# Patient Record
Sex: FEMALE | Race: WHITE | NOT HISPANIC OR LATINO | Employment: FULL TIME | URBAN - METROPOLITAN AREA
[De-identification: names, ages, dates, MRNs, and addresses within clinical notes are randomized per-mention and may not be internally consistent; named-entity substitution may affect disease eponyms.]

---

## 2023-06-20 ENCOUNTER — OFFICE VISIT (OUTPATIENT)
Dept: URGENT CARE | Facility: CLINIC | Age: 53
End: 2023-06-20
Payer: COMMERCIAL

## 2023-06-20 VITALS
WEIGHT: 206 LBS | HEART RATE: 78 BPM | BODY MASS INDEX: 36.5 KG/M2 | TEMPERATURE: 98 F | RESPIRATION RATE: 18 BRPM | OXYGEN SATURATION: 97 % | DIASTOLIC BLOOD PRESSURE: 80 MMHG | SYSTOLIC BLOOD PRESSURE: 138 MMHG | HEIGHT: 63 IN

## 2023-06-20 DIAGNOSIS — H00.015 HORDEOLUM EXTERNUM OF LEFT LOWER EYELID: Primary | ICD-10-CM

## 2023-06-20 PROBLEM — R60.9 EDEMA: Status: ACTIVE | Noted: 2020-03-23

## 2023-06-20 PROBLEM — M25.531 BILATERAL WRIST PAIN: Status: ACTIVE | Noted: 2021-09-27

## 2023-06-20 PROBLEM — J32.9 SINUSITIS: Status: ACTIVE | Noted: 2020-09-25

## 2023-06-20 PROBLEM — R00.1 BRADYCARDIA: Status: ACTIVE | Noted: 2020-09-25

## 2023-06-20 PROBLEM — M54.50 LOW BACK PAIN: Status: ACTIVE | Noted: 2020-01-22

## 2023-06-20 PROBLEM — A88.1 EPIDEMIC VERTIGO: Status: ACTIVE | Noted: 2020-01-22

## 2023-06-20 PROBLEM — G47.00 INSOMNIA: Status: ACTIVE | Noted: 2020-03-23

## 2023-06-20 PROBLEM — M65.30 ACQUIRED TRIGGER FINGER: Status: ACTIVE | Noted: 2021-08-24

## 2023-06-20 PROBLEM — R34 OLIGURIA: Status: ACTIVE | Noted: 2021-02-22

## 2023-06-20 PROBLEM — Z83.3 FAMILY HISTORY OF DIABETES MELLITUS: Status: ACTIVE | Noted: 2021-02-22

## 2023-06-20 PROBLEM — R33.9 INCOMPLETE EMPTYING OF BLADDER: Status: ACTIVE | Noted: 2022-05-03

## 2023-06-20 PROBLEM — E78.5 HYPERLIPIDEMIA: Status: ACTIVE | Noted: 2023-01-11

## 2023-06-20 PROBLEM — R73.9 HYPERGLYCEMIA: Status: ACTIVE | Noted: 2019-03-07

## 2023-06-20 PROBLEM — R42 DIZZINESS: Status: ACTIVE | Noted: 2020-09-25

## 2023-06-20 PROBLEM — M19.90 ARTHRITIS: Status: ACTIVE | Noted: 2023-01-11

## 2023-06-20 PROBLEM — A69.20 LYME DISEASE: Status: ACTIVE | Noted: 2019-12-18

## 2023-06-20 PROBLEM — L98.9 SKIN LESION: Status: ACTIVE | Noted: 2021-08-24
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
## 2023-06-20 PROBLEM — E55.9 VITAMIN D DEFICIENCY: Status: ACTIVE | Noted: 2020-03-23

## 2023-06-20 PROBLEM — F41.9 ANXIETY: Status: ACTIVE | Noted: 2021-08-24

## 2023-06-20 PROBLEM — D64.9 CHRONIC ANEMIA: Status: ACTIVE | Noted: 2023-01-11

## 2023-06-20 PROBLEM — M25.532 BILATERAL WRIST PAIN: Status: ACTIVE | Noted: 2021-09-27

## 2023-06-20 PROBLEM — Z80.6 FAMILY HISTORY OF LEUKEMIA: Status: ACTIVE | Noted: 2021-02-22

## 2023-06-20 PROBLEM — E66.9 OBESITY: Status: ACTIVE | Noted: 2020-09-25

## 2023-06-20 PROBLEM — M25.50 JOINT PAIN: Status: ACTIVE | Noted: 2020-03-23

## 2023-06-20 PROCEDURE — G0382 LEV 3 HOSP TYPE B ED VISIT: HCPCS

## 2023-06-20 RX ORDER — ROSUVASTATIN CALCIUM 5 MG/1
TABLET, COATED ORAL
COMMUNITY

## 2023-06-20 RX ORDER — FENOFIBRATE 134 MG/1
CAPSULE ORAL
COMMUNITY

## 2023-06-20 RX ORDER — CELECOXIB 200 MG/1
CAPSULE ORAL
COMMUNITY
Start: 2023-02-02

## 2023-06-20 RX ORDER — ASPIRIN 81 MG/1
TABLET ORAL
COMMUNITY

## 2023-06-20 RX ORDER — ERGOCALCIFEROL 1.25 MG/1
CAPSULE ORAL
COMMUNITY
Start: 2023-05-13

## 2023-06-20 RX ORDER — NYSTATIN 100000 U/G
CREAM TOPICAL
COMMUNITY
Start: 2023-06-12

## 2023-06-20 RX ORDER — FUROSEMIDE 20 MG/1
TABLET ORAL
COMMUNITY
Start: 2023-04-29

## 2023-06-20 RX ORDER — LACTULOSE 10 G/15ML
SOLUTION ORAL
COMMUNITY
Start: 2023-05-25

## 2023-06-20 RX ORDER — ESZOPICLONE 3 MG/1
TABLET, FILM COATED ORAL
COMMUNITY

## 2023-06-20 RX ORDER — AMOXICILLIN 500 MG
CAPSULE ORAL
COMMUNITY

## 2023-06-20 RX ORDER — DIAZEPAM 5 MG/1
TABLET ORAL
COMMUNITY

## 2023-06-20 RX ORDER — ERYTHROMYCIN 5 MG/G
0.5 OINTMENT OPHTHALMIC EVERY 6 HOURS SCHEDULED
Qty: 3.5 G | Refills: 0 | Status: SHIPPED | OUTPATIENT
Start: 2023-06-20

## 2023-06-20 NOTE — PATIENT INSTRUCTIONS
Apply ointment to affected eye daily for next 5-7 days as directed as symptoms persist  May also use warm compresses daily for additional relief  Wash hands frequently and avoid touching eyes  Follow-up with PCP in 3-5 days if no improvement of symptoms  Report to ER if symptoms worsen

## 2023-06-20 NOTE — PROGRESS NOTES
"  [address]        NAME: Fred Lange is a 52 y o  female  : 1970    MRN: [id number]  DATE: 2023  TIME: 8:47 AM    Assessment and Plan   Hordeolum externum of left lower eyelid [H00 015]  1  Hordeolum externum of left lower eyelid  erythromycin (ILOTYCIN) ophthalmic ointment        No foreign bodies visualized under fluorescein light  Visual acuity (uncorrected): 20/20 both eyes, 20/20 left, 20/20 right  Patient Instructions     Apply ointment to affected eye daily for next 5-7 days as directed as symptoms persist  May also use warm compresses daily for additional relief  Wash hands frequently and avoid touching eyes  Follow-up with PCP in 3-5 days if no improvement of symptoms  Report to ER if symptoms worsen  Chief Complaint     Chief Complaint   Patient presents with   • Eye Pain     Left eye swollen shut this am Painful and itchy  History of Present Illness       52year old female presents with left eye pain, swollen shut, and itchiness  that she woke up this morning with  She denies any known sick contacts or triggers but does have a history of seasonal allergies and relates she was cutting grass on Saturday  She denies associated fevers but does report cough, congestion, and a watery discharge  She has tried warm compresses for symptoms with minimal improvement  Eye Pain   The left eye is affected  This is a new problem  The current episode started today  The problem occurs constantly  The problem has been unchanged  There was no injury mechanism  The pain is at a severity of 5/10  The pain is moderate  There is no known exposure to pink eye  She does not wear contacts  Associated symptoms include eye redness, a foreign body sensation (unsure, \"feels like a film\") and itching  Pertinent negatives include no blurred vision, eye discharge, double vision, fever, nausea, photophobia, recent URI or vomiting  Treatments tried: cool compresses   The treatment " provided no relief  Review of Systems   Review of Systems   Constitutional: Negative for activity change, appetite change, chills, fatigue and fever  HENT: Positive for congestion  Negative for postnasal drip and sore throat  Eyes: Positive for pain, redness and itching  Negative for blurred vision, double vision, photophobia, discharge and visual disturbance  Respiratory: Negative for chest tightness and shortness of breath  Gastrointestinal: Negative for abdominal pain, constipation, diarrhea, nausea and vomiting  Allergic/Immunologic: Positive for environmental allergies  Negative for food allergies  Neurological: Negative for dizziness, light-headedness and numbness           Current Medications       Current Outpatient Medications:   •  aspirin (ECOTRIN LOW STRENGTH) 81 mg EC tablet, Take by mouth, Disp: , Rfl:   •  celecoxib (CeleBREX) 200 mg capsule, Take by mouth, Disp: , Rfl:   •  diazepam (VALIUM) 5 mg tablet, Take by mouth, Disp: , Rfl:   •  ergocalciferol (VITAMIN D2) 50,000 units, , Disp: , Rfl:   •  erythromycin (ILOTYCIN) ophthalmic ointment, Administer 0 5 inches into the left eye every 6 (six) hours, Disp: 3 5 g, Rfl: 0  •  eszopiclone (LUNESTA) 3 MG tablet, Take by mouth, Disp: , Rfl:   •  fenofibrate micronized (LOFIBRA) 134 MG capsule, Take by mouth, Disp: , Rfl:   •  furosemide (LASIX) 20 mg tablet, TAKE 2 TABLETS IN AM AND 1 TABLET IN PM, Disp: , Rfl:   •  lactulose (CHRONULAC) 10 g/15 mL solution, , Disp: , Rfl:   •  nystatin (MYCOSTATIN) cream, , Disp: , Rfl:   •  Omega-3 Fatty Acids (Fish Oil) 1200 MG CAPS, Take by mouth, Disp: , Rfl:   •  rosuvastatin (CRESTOR) 5 mg tablet, Take by mouth, Disp: , Rfl:     Current Allergies     Allergies as of 06/20/2023 - Reviewed 06/20/2023   Allergen Reaction Noted   • Pollen extract Eye Swelling 06/20/2023            The following portions of the patient's history were reviewed and updated as appropriate: allergies, current "medications, past family history, past medical history, past social history, past surgical history and problem list      Past Medical History:   Diagnosis Date   • Hyperlipidemia    • Hypertension        Past Surgical History:   Procedure Laterality Date   • GASTRIC RESTRICTION SURGERY  2010   • TONSILLECTOMY         History reviewed  No pertinent family history  Medications have been verified  Objective   /80   Pulse 78   Temp 98 °F (36 7 °C)   Resp 18   Ht 5' 3\" (1 6 m)   Wt 93 4 kg (206 lb)   SpO2 97%   BMI 36 49 kg/m²        Physical Exam     Physical Exam  Vitals and nursing note reviewed  Constitutional:       General: She is awake  Appearance: Normal appearance  She is overweight  HENT:      Head: Normocephalic and atraumatic  Right Ear: Hearing normal       Left Ear: Hearing normal       Nose: Congestion present  No rhinorrhea  Right Turbinates: Not enlarged, swollen or pale  Left Turbinates: Not enlarged, swollen or pale  Mouth/Throat:      Lips: Pink  Mouth: Mucous membranes are moist       Pharynx: Oropharynx is clear  Eyes:      General: Vision grossly intact  Right eye: No foreign body, discharge or hordeolum  Left eye: Discharge and hordeolum present  No foreign body  Conjunctiva/sclera:      Right eye: Right conjunctiva is not injected  No chemosis, exudate or hemorrhage  Left eye: Left conjunctiva is injected  No chemosis, exudate or hemorrhage  Pupils: Pupils are equal, round, and reactive to light  Visual Fields: Right eye visual fields normal and left eye visual fields normal    Cardiovascular:      Rate and Rhythm: Normal rate and regular rhythm  Pulses: Normal pulses  Heart sounds: Normal heart sounds  Pulmonary:      Effort: Pulmonary effort is normal       Breath sounds: Normal breath sounds     Musculoskeletal:      Cervical back: Full passive range of motion without pain, normal range of " motion and neck supple  Lymphadenopathy:      Cervical: No cervical adenopathy  Skin:     General: Skin is warm and dry  Neurological:      Mental Status: She is alert and oriented to person, place, and time  Psychiatric:         Mood and Affect: Mood normal          Behavior: Behavior normal  Behavior is cooperative  Thought Content:  Thought content normal          Judgment: Judgment normal

## 2023-08-19 PROBLEM — J32.9 SINUSITIS: Status: RESOLVED | Noted: 2020-09-25 | Resolved: 2023-08-19

## 2023-10-30 ENCOUNTER — COSMETIC (OUTPATIENT)
Dept: PLASTIC SURGERY | Facility: CLINIC | Age: 53
End: 2023-10-30

## 2023-10-30 VITALS
DIASTOLIC BLOOD PRESSURE: 88 MMHG | HEART RATE: 54 BPM | BODY MASS INDEX: 39.55 KG/M2 | SYSTOLIC BLOOD PRESSURE: 132 MMHG | WEIGHT: 223.25 LBS | HEIGHT: 63 IN | TEMPERATURE: 97.2 F

## 2023-10-30 DIAGNOSIS — E65 ABDOMINAL PANNUS: ICD-10-CM

## 2023-10-30 DIAGNOSIS — Z41.1 ENCOUNTER FOR COSMETIC PROCEDURE: Primary | ICD-10-CM

## 2023-10-30 PROCEDURE — COSCON COSMETIC CONSULTATION: Performed by: STUDENT IN AN ORGANIZED HEALTH CARE EDUCATION/TRAINING PROGRAM

## 2023-10-30 RX ORDER — FLUTICASONE PROPIONATE 50 MCG
SPRAY, SUSPENSION (ML) NASAL
COMMUNITY

## 2023-10-30 RX ORDER — MULTIVITAMIN
1 TABLET ORAL DAILY
COMMUNITY

## 2023-10-30 RX ORDER — DULOXETIN HYDROCHLORIDE 20 MG/1
20 CAPSULE, DELAYED RELEASE ORAL DAILY
COMMUNITY
Start: 2023-10-27

## 2023-10-30 RX ORDER — FUROSEMIDE 40 MG/1
40 TABLET ORAL DAILY
COMMUNITY
Start: 2023-08-26

## 2023-10-30 NOTE — PROGRESS NOTES
Plastic Surgery Consult    Reason for visit: interested in abdominoplasty    HPI from 10/30/23:  Patient is a 51 y/o female who is interested in abdominoplasty. Patient is a massive weight loss patient who underwent gastric sleeve in 2010. Her max weight was 395 lbs and currently is 220 lbs, and has had net weight-loss of 170 lbs. She has no history of blood clots. Currently she is at her goal weight between 200-220 lbs. She states that with her weight loss she has had significant excess skin that hangs over her umbilicus and pubis. She has been following up with her PCP and has had documented treatments including powders and creams to alleviate her symptoms of intrigo, open wounds, moisture, foul smell. She states that the symptoms are significantly exacerbated with hot weather during the summer. She has been following up with PCP for over 3 months time. She is primarily interested in removal of the excess skin. ROS: 12 pt ROS negative, except as otherwise noted in HPI    Past Medical History:   Diagnosis Date    Hyperlipidemia     Hypertension        FamHx: non-contrib.  No blood clots  SurgHx: gastric sleeve, tubal ligation, tonsillectomy  SocHx: no tobacco. +social ETOH  Meds: no steroids, no blood thinners  Allergies: NKDA    PE:    Vitals:    10/30/23 1018   BP: 132/88   Pulse: (!) 54   Temp: (!) 97.2 °F (36.2 °C)       General: NC/AT, breathing comfortably on RA  Neuro: CN II-XII grossly intact, symmetric reflexes  HEENT: PERRLA, EOMI, external ears normal, no lesions or deformities, neck supple, trachea midline  Respiratory: CTAB, normal respiratory effort  Cardio: RRR, normal S1, S2, no murmur, rubs, gallops  GI: soft, non-tender, non-distended  Extremities/MSK: normal alignment, mobility, gait, no edema  Skin: no rashes, lesions, subcutaneous nodules    BMI 39.55    Significant moderate-severe diffuse abdominal lipodystrophy   Severe excess skin in the vertical and horizontal vectors  Overhanging double pannus over the umbilicus and pubic area  Umbilical pannus with periumbilical intertrigo and redness  Overhanging abdominal pannus overhangs pubis by 5 cm, moist areas underneath  Minimal diastasis, no hernia    A/P: 51 y/o female who presents with symptomatic abdominal double pannus from massive weight loss of 170 lbs, now with stable weight over last 6-12 months.    -Discussed with patient that she is a good candidate for wazev-kt-okl panniculectomy. I explained that ssupv-mw-oqf panniculectomy is a functional procedure, not cosmetic. The only thing that will be removed is the overhanging excess abdominal skin in the umbilical region and overhanging pubic region. The abdominal contour and lipodystrophy is not addressed. Also, loss of umbilicus is a possibility as well as inferiorly displaced umbilicus. Patient acknowledged. She understands that the surgery will only remove the excess overhanging skin causing her symptoms. Due to the excess skin in the vertical and horizontal components as well as the overhanging double pannus over the umbilicus, patient would benefit from eypxg-tp-upt skin excision. This would create vertical midline scar, patient is okay and acknowledged this.  -At this time, patient is NOT a candidate for lipoabdominoplasty due to her elevated BMI of 39. I informed her that her BMI would need to be closer to 30 before any cosmetic surgery would be discussed. Currently, patient would like to proceed only with functional, insurance [id number] panniculectomy.  -I also informed her that her elevated BMI increases her risk of complications including but not limited to wound breakdown, infection, abscesses, delayed wound healing. Patient acknowledged.  -Risks, benefits, procedure, and complications discussed. Patient acknowledged.  All questions answered, concerns addressed.  -Photos taken, will submit for insurance approval.  -If approved, will require preop labs (CBC, CMP, HgbA1C, PT/INR, iron panel, Vit B12/folate) to be completed for preop appointment  -Spent 45 minutes in consultation with patient.  Greater than 50% of the total time was spent obtaining history, evaluation, performing exam, discussion of management options including post-operative care, answering patient's questions and concerns, chart reviewing, and documentation         Anthony Patel MD   [address] [place] [address] 304 and Reconstructive Surgery   [facility], [facility] Sharri Ortiz   Office: [phone]

## 2023-11-01 ENCOUNTER — TELEPHONE (OUTPATIENT)
Dept: PLASTIC SURGERY | Facility: CLINIC | Age: 53
End: 2023-11-01

## 2023-11-01 NOTE — TELEPHONE ENCOUNTER
Lm for patient to call me to discuss criteria for panniculectomy. Patient came in to see Dr. Jacobson Handing on 10/31 as cosmetic but wants through insurance. We need all documentation, 3 months of prescription cream/powder. Cassandra Diaz needs all the information to turn into [employer] for review and determination of surgery.

## 2023-11-08 ENCOUNTER — TELEPHONE (OUTPATIENT)
Dept: PLASTIC SURGERY | Facility: CLINIC | Age: 53
End: 2023-11-08

## 2023-11-08 NOTE — TELEPHONE ENCOUNTER
Called patient to review necessary criteria for panniculectomy documentation to be sent in to insurance company. Patient had seen Dr. Ruben Salguero.  Informed patient to please get me the information from her PCP so we can review and if enough then we can send to insurance for their review. Let patient know we need this documentation by 11/10/2023. Emailed criteria as well for patient reference.

## 2023-11-17 ENCOUNTER — PREP FOR PROCEDURE (OUTPATIENT)
Dept: PLASTIC SURGERY | Facility: CLINIC | Age: 53
End: 2023-11-17

## 2023-11-17 DIAGNOSIS — L98.7 EXCESS SKIN OF ABDOMEN: Primary | ICD-10-CM

## 2023-11-17 DIAGNOSIS — M79.3 PANNICULITIS: ICD-10-CM

## 2023-11-17 DIAGNOSIS — L30.4 INTERTRIGO: ICD-10-CM

## 2023-11-27 ENCOUNTER — TELEPHONE (OUTPATIENT)
Dept: PLASTIC SURGERY | Facility: CLINIC | Age: 53
End: 2023-11-27

## 2023-11-27 NOTE — TELEPHONE ENCOUNTER
Returned patients phone call and left her a message regarding labs that needs to be done before surgery.

## 2024-02-08 ENCOUNTER — OFFICE VISIT (OUTPATIENT)
Dept: PLASTIC SURGERY | Facility: CLINIC | Age: 54
End: 2024-02-08
Payer: COMMERCIAL

## 2024-02-08 VITALS
HEIGHT: 62 IN | DIASTOLIC BLOOD PRESSURE: 85 MMHG | HEART RATE: 82 BPM | WEIGHT: 205.13 LBS | TEMPERATURE: 97.7 F | SYSTOLIC BLOOD PRESSURE: 108 MMHG | BODY MASS INDEX: 37.75 KG/M2

## 2024-02-08 DIAGNOSIS — E65 ABDOMINAL PANNUS: Primary | ICD-10-CM

## 2024-02-08 PROCEDURE — CP99022 PRE-OP COSMETIC EVALUATION: Performed by: STUDENT IN AN ORGANIZED HEALTH CARE EDUCATION/TRAINING PROGRAM

## 2024-02-08 PROCEDURE — 99214 OFFICE O/P EST MOD 30 MIN: CPT | Performed by: STUDENT IN AN ORGANIZED HEALTH CARE EDUCATION/TRAINING PROGRAM

## 2024-02-08 RX ORDER — DULOXETIN HYDROCHLORIDE 60 MG/1
CAPSULE, DELAYED RELEASE ORAL
COMMUNITY
Start: 2024-02-07

## 2024-02-09 ENCOUNTER — APPOINTMENT (OUTPATIENT)
Dept: LAB | Facility: CLINIC | Age: 54
End: 2024-02-09
Payer: COMMERCIAL

## 2024-02-09 DIAGNOSIS — L98.7 EXCESS SKIN OF ABDOMEN: ICD-10-CM

## 2024-02-09 DIAGNOSIS — L30.4 INTERTRIGO: ICD-10-CM

## 2024-02-09 DIAGNOSIS — M79.3 PANNICULITIS: ICD-10-CM

## 2024-02-09 LAB
ALBUMIN SERPL BCP-MCNC: 4.5 G/DL (ref 3.5–5)
ALP SERPL-CCNC: 42 U/L (ref 34–104)
ALT SERPL W P-5'-P-CCNC: 18 U/L (ref 7–52)
ANION GAP SERPL CALCULATED.3IONS-SCNC: 13 MMOL/L
AST SERPL W P-5'-P-CCNC: 22 U/L (ref 13–39)
BASOPHILS # BLD AUTO: 0.02 THOUSANDS/ÂΜL (ref 0–0.1)
BASOPHILS NFR BLD AUTO: 0 % (ref 0–1)
BILIRUB SERPL-MCNC: 0.68 MG/DL (ref 0.2–1)
BUN SERPL-MCNC: 14 MG/DL (ref 5–25)
CALCIUM SERPL-MCNC: 9.4 MG/DL (ref 8.4–10.2)
CHLORIDE SERPL-SCNC: 98 MMOL/L (ref 96–108)
CO2 SERPL-SCNC: 31 MMOL/L (ref 21–32)
CREAT SERPL-MCNC: 0.69 MG/DL (ref 0.6–1.3)
EOSINOPHIL # BLD AUTO: 0.11 THOUSAND/ÂΜL (ref 0–0.61)
EOSINOPHIL NFR BLD AUTO: 2 % (ref 0–6)
ERYTHROCYTE [DISTWIDTH] IN BLOOD BY AUTOMATED COUNT: 14.5 % (ref 11.6–15.1)
GFR SERPL CREATININE-BSD FRML MDRD: 98 ML/MIN/1.73SQ M
GLUCOSE P FAST SERPL-MCNC: 75 MG/DL (ref 65–99)
HCT VFR BLD AUTO: 44 % (ref 34.8–46.1)
HGB BLD-MCNC: 14.1 G/DL (ref 11.5–15.4)
IMM GRANULOCYTES # BLD AUTO: 0.01 THOUSAND/UL (ref 0–0.2)
IMM GRANULOCYTES NFR BLD AUTO: 0 % (ref 0–2)
LYMPHOCYTES # BLD AUTO: 2.74 THOUSANDS/ÂΜL (ref 0.6–4.47)
LYMPHOCYTES NFR BLD AUTO: 44 % (ref 14–44)
MCH RBC QN AUTO: 30.7 PG (ref 26.8–34.3)
MCHC RBC AUTO-ENTMCNC: 32 G/DL (ref 31.4–37.4)
MCV RBC AUTO: 96 FL (ref 82–98)
MONOCYTES # BLD AUTO: 0.69 THOUSAND/ÂΜL (ref 0.17–1.22)
MONOCYTES NFR BLD AUTO: 11 % (ref 4–12)
NEUTROPHILS # BLD AUTO: 2.67 THOUSANDS/ÂΜL (ref 1.85–7.62)
NEUTS SEG NFR BLD AUTO: 43 % (ref 43–75)
NRBC BLD AUTO-RTO: 0 /100 WBCS
PLATELET # BLD AUTO: 311 THOUSANDS/UL (ref 149–390)
PMV BLD AUTO: 10.2 FL (ref 8.9–12.7)
POTASSIUM SERPL-SCNC: 3.4 MMOL/L (ref 3.5–5.3)
PROT SERPL-MCNC: 7.4 G/DL (ref 6.4–8.4)
RBC # BLD AUTO: 4.59 MILLION/UL (ref 3.81–5.12)
SODIUM SERPL-SCNC: 142 MMOL/L (ref 135–147)
WBC # BLD AUTO: 6.24 THOUSAND/UL (ref 4.31–10.16)

## 2024-02-09 PROCEDURE — 80053 COMPREHEN METABOLIC PANEL: CPT

## 2024-02-09 PROCEDURE — 36415 COLL VENOUS BLD VENIPUNCTURE: CPT

## 2024-02-09 PROCEDURE — 85025 COMPLETE CBC W/AUTO DIFF WBC: CPT

## 2024-02-11 RX ORDER — NALOXONE HYDROCHLORIDE 4 MG/.1ML
SPRAY NASAL
Qty: 1 EACH | Refills: 1 | Status: SHIPPED | OUTPATIENT
Start: 2024-02-11 | End: 2025-02-10

## 2024-02-11 RX ORDER — DOCUSATE SODIUM 100 MG/1
100 CAPSULE, LIQUID FILLED ORAL 2 TIMES DAILY PRN
Qty: 20 CAPSULE | Refills: 2 | Status: SHIPPED | OUTPATIENT
Start: 2024-02-11

## 2024-02-11 RX ORDER — ACETAMINOPHEN 500 MG
500 TABLET ORAL EVERY 4 HOURS PRN
Qty: 30 TABLET | Refills: 2 | Status: SHIPPED | OUTPATIENT
Start: 2024-02-11

## 2024-02-11 RX ORDER — OXYCODONE HYDROCHLORIDE 5 MG/1
5 TABLET ORAL EVERY 4 HOURS PRN
Qty: 30 TABLET | Refills: 0 | Status: SHIPPED | OUTPATIENT
Start: 2024-02-11

## 2024-02-11 RX ORDER — ONDANSETRON 4 MG/1
4 TABLET, FILM COATED ORAL EVERY 8 HOURS PRN
Qty: 20 TABLET | Refills: 0 | Status: SHIPPED | OUTPATIENT
Start: 2024-02-11

## 2024-02-11 NOTE — PROGRESS NOTES
Plastic Surgery Consult     Reason for visit: preop for liposuction of abdomen and uiiur-fr-lfd panniculectomy    HPI from 2/8/24:    Preop for liposuction of abdomen and osvut-sj-zda panniculectomy     BMI 37.5     Significant moderate-severe diffuse abdominal lipodystrophy   Severe excess skin in the vertical and horizontal vectors  Overhanging double pannus over the umbilicus and pubic area  Umbilical pannus with periumbilical intertrigo and redness  Overhanging abdominal pannus overhangs pubis by 5 cm, moist areas underneath  Minimal diastasis, no hernia    I discussed that the bmosb-zu-abm procedure is a functional surgery and the liposuction is cosmetic for improved body contouring. Patient acknowledged.     Discussed risks, benefits, procedure and complications including but not limited to infection, bleeding, scarring, abscess, seromas, blood clots, contour deformity and asymmetry. With liposuction, contour deformities are a risk. I discussed scarring, loss of umbilicus, possible inferior displacement of umbilicus. Patient acknowledged.     I discussed postoperative care with usage of drains and garment was measured.    I discussed increased risk of complications given her elevated BMI of 37.5, including wound dehiscence particularly at T incision and delayed wound healing. Patient acknowledged.     Photos taken, surgery scheduled.  Will need tumescence and exparel  Garment measured  Consents obtained, will send prescriptions to pharmacy  -Spent 35 minutes in consultation with patient. Greater than 50% of the total time was spent obtaining history, evaluation, performing exam, discussion of management options including post-operative care, answering patient's questions and concerns, chart reviewing, and documentation       Leonard Bustamante MD   [facility]   [address] [place] [address], [address]   [place] [address]   Office: [phone]       HPI from 10/30/23:  Patient is a 54 y/o  female who is interested in abdominoplasty. Patient is a massive weight loss patient who underwent gastric sleeve in 2010. Her max weight was 395 lbs and currently is 220 lbs, and has had net weight-loss of 170 lbs. She has no history of blood clots.      Currently she is at her goal weight between 200-220 lbs. She states that with her weight loss she has had significant excess skin that hangs over her umbilicus and pubis. She has been following up with her PCP and has had documented treatments including powders and creams to alleviate her symptoms of intrigo, open wounds, moisture, foul smell. She states that the symptoms are significantly exacerbated with hot weather during the summer. She has been following up with PCP for over 3 months time.      She is primarily interested in removal of the excess skin.     ROS: 12 pt ROS negative, except as otherwise noted in HPI     Medical History        Past Medical History:   Diagnosis Date    Hyperlipidemia      Hypertension              FamHx: non-contrib. No blood clots  SurgHx: gastric sleeve, tubal ligation, tonsillectomy  SocHx: no tobacco. +social ETOH  Meds: no steroids, no blood thinners  Allergies: NKDA     PE:         Vitals:     10/30/23 1018   BP: 132/88   Pulse: (!) 54   Temp: (!) 97.2 °F (36.2 °C)         General: NC/AT, breathing comfortably on RA  Neuro: CN II-XII grossly intact, symmetric reflexes  HEENT: PERRLA, EOMI, external ears normal, no lesions or deformities, neck supple, trachea midline  Respiratory: CTAB, normal respiratory effort  Cardio: RRR, normal S1, S2, no murmur, rubs, gallops  GI: soft, non-tender, non-distended  Extremities/MSK: normal alignment, mobility, gait, no edema  Skin: no rashes, lesions, subcutaneous nodules     BMI 39.55     Significant moderate-severe diffuse abdominal lipodystrophy   Severe excess skin in the vertical and horizontal vectors  Overhanging double pannus over the umbilicus and pubic area  Umbilical pannus with  periumbilical intertrigo and redness  Overhanging abdominal pannus overhangs pubis by 5 cm, moist areas underneath  Minimal diastasis, no hernia     A/P: 52 y/o female who presents with symptomatic abdominal double pannus from massive weight loss of 170 lbs, now with stable weight over last 6-12 months.     -Discussed with patient that she is a good candidate for fpqmd-yr-qna panniculectomy. I explained that liduz-kx-kji panniculectomy is a functional procedure, not cosmetic. The only thing that will be removed is the overhanging excess abdominal skin in the umbilical region and overhanging pubic region. The abdominal contour and lipodystrophy is not addressed. Also, loss of umbilicus is a possibility as well as inferiorly displaced umbilicus. Patient acknowledged. She understands that the surgery will only remove the excess overhanging skin causing her symptoms. Due to the excess skin in the vertical and horizontal components as well as the overhanging double pannus over the umbilicus, patient would benefit from hzdzo-iu-uol skin excision. This would create vertical midline scar, patient is okay and acknowledged this.  -At this time, patient is NOT a candidate for lipoabdominoplasty due to her elevated BMI of 39. I informed her that her BMI would need to be closer to 30 before any cosmetic surgery would be discussed. Currently, patient would like to proceed only with functional, insurance [id number]-ob-rbz panniculectomy.  -I also informed her that her elevated BMI increases her risk of complications including but not limited to wound breakdown, infection, abscesses, delayed wound healing. Patient acknowledged.  -Risks, benefits, procedure, and complications discussed. Patient acknowledged. All questions answered, concerns addressed.  -Photos taken, will submit for insurance approval.  -If approved, will require preop labs (CBC, CMP, HgbA1C, PT/INR, iron panel, Vit B12/folate) to be completed for preop  appointment  -Spent 45 minutes in consultation with patient. Greater than 50% of the total time was spent obtaining history, evaluation, performing exam, discussion of management options including post-operative care, answering patient's questions and concerns, chart reviewing, and documentation           Leonard Bustamante MD   [facility]astic and Reconstructive Surgery   [address] [place] [address], [address]   [place] [address]   Office: [phone]

## 2024-02-27 NOTE — PRE-PROCEDURE INSTRUCTIONS
Pre-Surgery Instructions:   Medication Instructions    acetaminophen (TYLENOL) 500 mg tablet Uses PRN- OK to take day of surgery    aspirin (ECOTRIN LOW STRENGTH) 81 mg EC tablet Stop taking 7 days prior to surgery.    celecoxib (CeleBREX) 200 mg capsule Stop taking 3 days prior to surgery.    diazepam (VALIUM) 5 mg tablet Take day of surgery.    docusate sodium (COLACE) 100 mg capsule Take day of surgery.    DULoxetine (CYMBALTA) 60 mg delayed release capsule Take night before surgery    eszopiclone (LUNESTA) 3 MG tablet Take night before surgery    fenofibrate micronized (LOFIBRA) 134 MG capsule Take night before surgery    fluticasone (FLONASE) 50 mcg/act nasal spray Uses PRN- OK to take day of surgery    furosemide (LASIX) 20 mg tablet Hold day of surgery.    furosemide (LASIX) 40 mg tablet Hold day of surgery.    lactulose (CHRONULAC) 10 g/15 mL solution Hold day of surgery.    Multiple Vitamin (Multi-Vitamin Daily) TABS Stop taking 7 days prior to surgery.    naloxone (NARCAN) 4 mg/0.1 mL nasal spray Hold day of surgery.    nystatin (MYCOSTATIN) cream Uses PRN- DO NOT take day of surgery    ondansetron (ZOFRAN) 4 mg tablet Uses PRN- OK to take day of surgery    oxyCODONE (Roxicodone) 5 immediate release tablet Uses PRN- OK to take day of surgery    rosuvastatin (CRESTOR) 5 mg tablet Take night before surgery   Medication instructions for day surgery reviewed. Please use only a sip of water to take your instructed medications. Avoid all over the counter vitamins, supplements and NSAIDS for one week prior to surgery per anesthesia guidelines. Tylenol is ok to take as needed.     You will receive a call one business day prior to surgery with an arrival time and hospital directions. If your surgery is scheduled on a Monday, the hospital will be calling you on the Friday prior to your surgery. If you have not heard from anyone by 8pm, please call the hospital supervisor through the hospital  at  [phone]. ([place] [phone] or [place] [phone]).    Do not eat or drink anything after midnight the night before your surgery, including candy, mints, lifesavers, or chewing gum. Do not drink alcohol 24hrs before your surgery. Try not to smoke at least 24hrs before your surgery.       Follow the pre surgery showering instructions as listed in the “My Surgical Experience Booklet” or otherwise provided by your surgeon's office. Do not use a blade to shave the surgical area 1 week before surgery. It is okay to use a clean electric clippers up to 24 hours before surgery. Do not apply any lotions, creams, including makeup, cologne, deodorant, or perfumes after showering on the day of your surgery. Do not use dry shampoo, hair spray, hair gel, or any type of hair products.     No contact lenses, eye make-up, or artificial eyelashes. Remove nail polish, including gel polish, and any artificial, gel, or acrylic nails if possible. Remove all jewelry including rings and body piercing jewelry.     Wear causal clothing that is easy to take on and off. Consider your type of surgery.    Keep any valuables, jewelry, piercings at home. Please bring any specially ordered equipment (sling, braces) if indicated.    Arrange for a responsible person to drive you to and from the hospital on the day of your surgery. Please confirm the visitor policy for the day of your procedure when you receive your phone call with an arrival time.     Call the surgeon's office with any new illnesses, exposures, or additional questions prior to surgery.    Please reference your “My Surgical Experience Booklet” for additional information to prepare for your upcoming surgery.

## 2024-02-29 ENCOUNTER — TELEPHONE (OUTPATIENT)
Age: 54
End: 2024-02-29

## 2024-02-29 NOTE — TELEPHONE ENCOUNTER
S/W patient and she wanted work from home. From 3/18/2024-4/15/2024 8 hour days. Asked that I call Hieu Jung @ [phone] and inform her.     Called Hieu and informed her of the plan and she stated that it was all set.

## 2024-02-29 NOTE — TELEPHONE ENCOUNTER
Rec'd call from patient requesting to speak to Domitila regarding her FMLA paperwork.    She states that her consultant has some issues.    Domitila currently unavailable to speak to patient - told her that I would forward message.    Please return call to patient.    (Medical Accommodation Consultation is Lee Behrman [phone]. - Patient states that you'll need this info at some time.)

## 2024-03-01 DIAGNOSIS — E65 ABDOMINAL PANNUS: ICD-10-CM

## 2024-03-01 RX ORDER — ONDANSETRON 4 MG/1
TABLET, FILM COATED ORAL
Qty: 20 TABLET | Refills: 0 | Status: SHIPPED | OUTPATIENT
Start: 2024-03-01

## 2024-03-07 ENCOUNTER — HOSPITAL ENCOUNTER (OUTPATIENT)
Facility: HOSPITAL | Age: 54
Setting detail: OUTPATIENT SURGERY
Discharge: HOME | End: 2024-03-07
Attending: STUDENT IN AN ORGANIZED HEALTH CARE EDUCATION/TRAINING PROGRAM | Admitting: STUDENT IN AN ORGANIZED HEALTH CARE EDUCATION/TRAINING PROGRAM
Payer: SELF-PAY

## 2024-03-07 ENCOUNTER — ANESTHESIA EVENT (OUTPATIENT)
Dept: PERIOP | Facility: HOSPITAL | Age: 54
End: 2024-03-07
Payer: SELF-PAY

## 2024-03-07 ENCOUNTER — ANESTHESIA (OUTPATIENT)
Dept: PERIOP | Facility: HOSPITAL | Age: 54
End: 2024-03-07
Payer: SELF-PAY

## 2024-03-07 VITALS
BODY MASS INDEX: 37.73 KG/M2 | OXYGEN SATURATION: 97 % | SYSTOLIC BLOOD PRESSURE: 101 MMHG | HEART RATE: 68 BPM | RESPIRATION RATE: 16 BRPM | DIASTOLIC BLOOD PRESSURE: 59 MMHG | TEMPERATURE: 97.8 F | WEIGHT: 205 LBS | HEIGHT: 62 IN

## 2024-03-07 PROCEDURE — C9290 INJ, BUPIVACAINE LIPOSOME: HCPCS | Performed by: STUDENT IN AN ORGANIZED HEALTH CARE EDUCATION/TRAINING PROGRAM

## 2024-03-07 PROCEDURE — 15847 EXC SKIN ABD ADD-ON: CPT | Performed by: PHYSICIAN ASSISTANT

## 2024-03-07 PROCEDURE — 15830 EXC EXCESSIVE SKIN ABDOMEN: CPT | Performed by: PHYSICIAN ASSISTANT

## 2024-03-07 PROCEDURE — 15847 EXC SKIN ABD ADD-ON: CPT | Performed by: STUDENT IN AN ORGANIZED HEALTH CARE EDUCATION/TRAINING PROGRAM

## 2024-03-07 PROCEDURE — 15830 EXC EXCESSIVE SKIN ABDOMEN: CPT | Performed by: STUDENT IN AN ORGANIZED HEALTH CARE EDUCATION/TRAINING PROGRAM

## 2024-03-07 PROCEDURE — 99024 POSTOP FOLLOW-UP VISIT: CPT | Performed by: STUDENT IN AN ORGANIZED HEALTH CARE EDUCATION/TRAINING PROGRAM

## 2024-03-07 RX ORDER — MINERAL OIL
OIL (ML) MISCELLANEOUS AS NEEDED
Status: DISCONTINUED | OUTPATIENT
Start: 2024-03-07 | End: 2024-03-07 | Stop reason: HOSPADM

## 2024-03-07 RX ORDER — LIDOCAINE HYDROCHLORIDE AND EPINEPHRINE 10; 10 MG/ML; UG/ML
INJECTION, SOLUTION INFILTRATION; PERINEURAL AS NEEDED
Status: DISCONTINUED | OUTPATIENT
Start: 2024-03-07 | End: 2024-03-07 | Stop reason: HOSPADM

## 2024-03-07 RX ORDER — SODIUM CHLORIDE, SODIUM LACTATE, POTASSIUM CHLORIDE, CALCIUM CHLORIDE 600; 310; 30; 20 MG/100ML; MG/100ML; MG/100ML; MG/100ML
125 INJECTION, SOLUTION INTRAVENOUS CONTINUOUS
Status: DISCONTINUED | OUTPATIENT
Start: 2024-03-07 | End: 2024-03-07 | Stop reason: HOSPADM

## 2024-03-07 RX ORDER — LIDOCAINE HYDROCHLORIDE 10 MG/ML
INJECTION, SOLUTION EPIDURAL; INFILTRATION; INTRACAUDAL; PERINEURAL AS NEEDED
Status: DISCONTINUED | OUTPATIENT
Start: 2024-03-07 | End: 2024-03-07

## 2024-03-07 RX ORDER — OXYCODONE HYDROCHLORIDE 5 MG/1
5 TABLET ORAL EVERY 4 HOURS PRN
Status: DISCONTINUED | OUTPATIENT
Start: 2024-03-07 | End: 2024-03-07 | Stop reason: HOSPADM

## 2024-03-07 RX ORDER — DEXAMETHASONE SODIUM PHOSPHATE 10 MG/ML
INJECTION, SOLUTION INTRAMUSCULAR; INTRAVENOUS AS NEEDED
Status: DISCONTINUED | OUTPATIENT
Start: 2024-03-07 | End: 2024-03-07

## 2024-03-07 RX ORDER — PROPOFOL 10 MG/ML
INJECTION, EMULSION INTRAVENOUS CONTINUOUS PRN
Status: DISCONTINUED | OUTPATIENT
Start: 2024-03-07 | End: 2024-03-07

## 2024-03-07 RX ORDER — ONDANSETRON 2 MG/ML
INJECTION INTRAMUSCULAR; INTRAVENOUS AS NEEDED
Status: DISCONTINUED | OUTPATIENT
Start: 2024-03-07 | End: 2024-03-07

## 2024-03-07 RX ORDER — ONDANSETRON 2 MG/ML
4 INJECTION INTRAMUSCULAR; INTRAVENOUS ONCE AS NEEDED
Status: DISCONTINUED | OUTPATIENT
Start: 2024-03-07 | End: 2024-03-07 | Stop reason: HOSPADM

## 2024-03-07 RX ORDER — NALOXONE HYDROCHLORIDE 0.4 MG/ML
INJECTION, SOLUTION INTRAMUSCULAR; INTRAVENOUS; SUBCUTANEOUS AS NEEDED
Status: DISCONTINUED | OUTPATIENT
Start: 2024-03-07 | End: 2024-03-07

## 2024-03-07 RX ORDER — DIPHENHYDRAMINE HYDROCHLORIDE 50 MG/ML
12.5 INJECTION INTRAMUSCULAR; INTRAVENOUS ONCE AS NEEDED
Status: DISCONTINUED | OUTPATIENT
Start: 2024-03-07 | End: 2024-03-07 | Stop reason: HOSPADM

## 2024-03-07 RX ORDER — FENTANYL CITRATE/PF 50 MCG/ML
50 SYRINGE (ML) INJECTION
Status: DISCONTINUED | OUTPATIENT
Start: 2024-03-07 | End: 2024-03-07 | Stop reason: HOSPADM

## 2024-03-07 RX ORDER — CEFAZOLIN SODIUM 2 G/50ML
2000 SOLUTION INTRAVENOUS ONCE
Status: COMPLETED | OUTPATIENT
Start: 2024-03-07 | End: 2024-03-07

## 2024-03-07 RX ORDER — FENTANYL CITRATE 50 UG/ML
INJECTION, SOLUTION INTRAMUSCULAR; INTRAVENOUS AS NEEDED
Status: DISCONTINUED | OUTPATIENT
Start: 2024-03-07 | End: 2024-03-07

## 2024-03-07 RX ORDER — ACETAMINOPHEN 10 MG/ML
INJECTION, SOLUTION INTRAVENOUS AS NEEDED
Status: DISCONTINUED | OUTPATIENT
Start: 2024-03-07 | End: 2024-03-07

## 2024-03-07 RX ORDER — METOCLOPRAMIDE HYDROCHLORIDE 5 MG/ML
INJECTION INTRAMUSCULAR; INTRAVENOUS AS NEEDED
Status: DISCONTINUED | OUTPATIENT
Start: 2024-03-07 | End: 2024-03-07

## 2024-03-07 RX ORDER — HYDROMORPHONE HYDROCHLORIDE 2 MG/ML
INJECTION, SOLUTION INTRAMUSCULAR; INTRAVENOUS; SUBCUTANEOUS AS NEEDED
Status: DISCONTINUED | OUTPATIENT
Start: 2024-03-07 | End: 2024-03-07

## 2024-03-07 RX ORDER — PROPOFOL 10 MG/ML
INJECTION, EMULSION INTRAVENOUS AS NEEDED
Status: DISCONTINUED | OUTPATIENT
Start: 2024-03-07 | End: 2024-03-07

## 2024-03-07 RX ORDER — GABAPENTIN 300 MG/1
300 CAPSULE ORAL ONCE
Status: COMPLETED | OUTPATIENT
Start: 2024-03-07 | End: 2024-03-07

## 2024-03-07 RX ORDER — ACETAMINOPHEN 325 MG/1
975 TABLET ORAL ONCE
Status: COMPLETED | OUTPATIENT
Start: 2024-03-07 | End: 2024-03-07

## 2024-03-07 RX ORDER — BUPIVACAINE HYDROCHLORIDE 2.5 MG/ML
INJECTION, SOLUTION EPIDURAL; INFILTRATION; INTRACAUDAL AS NEEDED
Status: DISCONTINUED | OUTPATIENT
Start: 2024-03-07 | End: 2024-03-07 | Stop reason: HOSPADM

## 2024-03-07 RX ORDER — ROCURONIUM BROMIDE 10 MG/ML
INJECTION, SOLUTION INTRAVENOUS AS NEEDED
Status: DISCONTINUED | OUTPATIENT
Start: 2024-03-07 | End: 2024-03-07

## 2024-03-07 RX ORDER — SODIUM CHLORIDE, SODIUM LACTATE, POTASSIUM CHLORIDE, CALCIUM CHLORIDE 600; 310; 30; 20 MG/100ML; MG/100ML; MG/100ML; MG/100ML
INJECTION, SOLUTION INTRAVENOUS CONTINUOUS PRN
Status: DISCONTINUED | OUTPATIENT
Start: 2024-03-07 | End: 2024-03-07

## 2024-03-07 RX ADMIN — LIDOCAINE HYDROCHLORIDE: 10 INJECTION, SOLUTION EPIDURAL; INFILTRATION; INTRACAUDAL; PERINEURAL at 16:28

## 2024-03-07 RX ADMIN — SODIUM CHLORIDE, SODIUM LACTATE, POTASSIUM CHLORIDE, AND CALCIUM CHLORIDE: .6; .31; .03; .02 INJECTION, SOLUTION INTRAVENOUS at 10:11

## 2024-03-07 RX ADMIN — ONDANSETRON 4 MG: 2 INJECTION INTRAMUSCULAR; INTRAVENOUS at 16:23

## 2024-03-07 RX ADMIN — ACETAMINOPHEN 1000 MG: 10 INJECTION INTRAVENOUS at 17:00

## 2024-03-07 RX ADMIN — LIDOCAINE HYDROCHLORIDE 50 MG: 10 INJECTION, SOLUTION EPIDURAL; INFILTRATION; INTRACAUDAL; PERINEURAL at 13:27

## 2024-03-07 RX ADMIN — HYDROMORPHONE HYDROCHLORIDE 0.5 MG: 2 INJECTION, SOLUTION INTRAMUSCULAR; INTRAVENOUS; SUBCUTANEOUS at 14:07

## 2024-03-07 RX ADMIN — CEFAZOLIN SODIUM 2000 MG: 2 SOLUTION INTRAVENOUS at 13:20

## 2024-03-07 RX ADMIN — ROCURONIUM BROMIDE 20 MG: 10 INJECTION, SOLUTION INTRAVENOUS at 14:12

## 2024-03-07 RX ADMIN — DEXAMETHASONE SODIUM PHOSPHATE 10 MG: 10 INJECTION, SOLUTION INTRAMUSCULAR; INTRAVENOUS at 14:01

## 2024-03-07 RX ADMIN — OXYCODONE HYDROCHLORIDE 5 MG: 5 TABLET ORAL at 18:28

## 2024-03-07 RX ADMIN — HYDROMORPHONE HYDROCHLORIDE 0.5 MG: 2 INJECTION, SOLUTION INTRAMUSCULAR; INTRAVENOUS; SUBCUTANEOUS at 16:02

## 2024-03-07 RX ADMIN — ACETAMINOPHEN 975 MG: 325 TABLET ORAL at 10:05

## 2024-03-07 RX ADMIN — METOCLOPRAMIDE 10 MG: 5 INJECTION, SOLUTION INTRAMUSCULAR; INTRAVENOUS at 17:04

## 2024-03-07 RX ADMIN — SUGAMMADEX 200 MG: 100 INJECTION, SOLUTION INTRAVENOUS at 17:02

## 2024-03-07 RX ADMIN — PROPOFOL 200 MG: 10 INJECTION, EMULSION INTRAVENOUS at 13:27

## 2024-03-07 RX ADMIN — FENTANYL CITRATE 50 MCG: 50 INJECTION, SOLUTION INTRAMUSCULAR; INTRAVENOUS at 13:27

## 2024-03-07 RX ADMIN — FENTANYL CITRATE 50 MCG: 50 INJECTION, SOLUTION INTRAMUSCULAR; INTRAVENOUS at 13:32

## 2024-03-07 RX ADMIN — ROCURONIUM BROMIDE 50 MG: 10 INJECTION, SOLUTION INTRAVENOUS at 13:27

## 2024-03-07 RX ADMIN — SODIUM CHLORIDE, SODIUM LACTATE, POTASSIUM CHLORIDE, AND CALCIUM CHLORIDE: .6; .31; .03; .02 INJECTION, SOLUTION INTRAVENOUS at 14:49

## 2024-03-07 RX ADMIN — ROCURONIUM BROMIDE 10 MG: 10 INJECTION, SOLUTION INTRAVENOUS at 14:51

## 2024-03-07 RX ADMIN — GABAPENTIN 300 MG: 300 CAPSULE ORAL at 10:05

## 2024-03-07 RX ADMIN — SODIUM CHLORIDE, SODIUM LACTATE, POTASSIUM CHLORIDE, AND CALCIUM CHLORIDE: .6; .31; .03; .02 INJECTION, SOLUTION INTRAVENOUS at 16:21

## 2024-03-07 RX ADMIN — NALXONE HYDROCHLORIDE 0.2 MG: 0.4 INJECTION INTRAMUSCULAR; INTRAVENOUS; SUBCUTANEOUS at 17:31

## 2024-03-07 RX ADMIN — PROPOFOL 50 MCG/KG/MIN: 10 INJECTION, EMULSION INTRAVENOUS at 13:32

## 2024-03-07 NOTE — OP NOTE
OPERATIVE REPORT  PATIENT NAME: Linda Rendon    :  1970  MRN: [id number]  Pt Location:  OR ROOM 08    SURGERY DATE: 3/7/2024    Surgeons and Role:     * Leonard Bustamante MD - Primary     * Tesfaye Valenzuela PA-C - Assisting    Preop Diagnosis:  Excess skin of abdomen [L98.7]  Panniculitis [M79.3]  Intertrigo [L30.4]    Post-Op Diagnosis Codes:     * Excess skin of abdomen [L98.7]     * Panniculitis [M79.3]     * Intertrigo [L30.4]    Procedure(s):  Lnnyc-ne-eca panniculectomy (insurance)  Abdominal liposuction (cosmetic)    Specimen(s):  * No specimens in log *    Estimated Blood Loss:   Minimal    Drains:  Closed/Suction Drain Lateral;Right Hip 19 Fr. (Active)   Number of days: 0       Closed/Suction Drain Left Hip Bulb 19 Fr. (Active)   Number of days: 0       Urethral Catheter Latex 16 Fr. (Active)   Number of days: 0       Anesthesia Type:   General    Operative Indications:  Excess skin of abdomen [L98.7]  Panniculitis [M79.3]  Intertrigo [L30.4]    Operative Findings:  Abdominal pannus weight 5 lb, 12 oz  Total tumescence 300 cc  Total lipoaspirate 400 cc  Total of 20 cc of exparel mixed with 20 cc of 0.25% marcaine plain used for TAP block  Total 20 cc of lidocaine with epi utilized    Complications:   None    Procedure and Technique:  Patient was brought to the operating room, transferred to the operating table in supine fashion. After undergoing general anesthesia, a timeout was performed at which point all patient identifiers were deemed to be correct. The patient's abdomen was prepped and draped in the normal sterile fashion. I first began by reinforcing my preop markings. Next, I injected the horizontal incision with 20 cc of 1% lidocaine with epi. After allowing for the vasoconstriction to take effect, I made incision and extended to level of fascia and dissected superiorly toward umbilicus. The umbilicus was circumferentially incised around. The excess abdominal skin was advanced and excised.  The horizontal incision was tailortacked. After, the vertical segment of the utvdw-rq-eaq was marked, tailortacked, and excised down to fascia. It was made certain that no further undermining of the abdominal flaps was made more than necessary to close the incision under minimal tension. The skin edges at the distal edges of the skin flaps had healthy arterial bleeding at time of closure. The wound was irrigated, hemostasis was achieved with electrocautery. The umbilicus was tacked down to the fascia with 3-0 monocryl. I then performed bilateral transversus abdominis plane nerve block with 20 cc of exparel mixed with 0.25% marcaine plain. Prior to closure, I infiltrated the pre-operatively marked areas with total of 300 cc of normal tumescent fluid and after allowing to take effect, liposuctioned to satisfactory contour. Next I proceeded with closure. For the horizontal incision, scarpas was reapproximated with 1-0 stratafix, followed by insorb for the dermis, and 3-0 stratafix for the skin. The vertical incision was closed with 2-0 PDS for the fascia, followed by insorb for the dermis, and 3-0 stratafix for the skin. The umbilicus was inset with 3-0 monocryl for dermis, and running 4-0 nylon for skin. Prior to closure two 19 Lithuanian drains were placed and secured with 3-0 nylon suture. Prineo skin closure system was applied to the vertical and horizontal incisions followed by gauze, pads, and abdominal binder.     This concluded the procedure. Patient tolerated the procedure well without complications. At the end of the case, all sponge, needle, and instrument counts were correct. Patient was awakened from anesthesia and taken to the PACU in stable condition.     I was present for the entire procedure, A qualified resident physician was not available and a physician assistant was required during the procedure for retraction, tissue handling, dissection and suturing      Patient Disposition:  PACU         SIGNATURE:  Leonard Bustamante MD  DATE: March 7, 2024  TIME: 5:10 PM

## 2024-03-07 NOTE — H&P
"Plastic Surgery Attending    H&P reviewed, no new changes. RE-iterated risks, particularly \"T\" point dehiscence, fat necrosis, delayed wound healing, especially given her elevated BMI of 37. Patient acknowledged.    Leonard Bustamante MD   [facility] Plastic and Reconstructive Surgery   [address] [place] [address], [address]   [place] [address]   Office: [phone]    Plastic Surgery Consult     Reason for visit: preop for liposuction of abdomen and pxqph-on-qpo panniculectomy     HPI from 2/8/24:     Preop for liposuction of abdomen and ixbtz-hu-jst panniculectomy     BMI 37.5     Significant moderate-severe diffuse abdominal lipodystrophy   Severe excess skin in the vertical and horizontal vectors  Overhanging double pannus over the umbilicus and pubic area  Umbilical pannus with periumbilical intertrigo and redness  Overhanging abdominal pannus overhangs pubis by 5 cm, moist areas underneath  Minimal diastasis, no hernia     I discussed that the atxly-ge-rul procedure is a functional surgery and the liposuction is cosmetic for improved body contouring. Patient acknowledged.     Discussed risks, benefits, procedure and complications including but not limited to infection, bleeding, scarring, abscess, seromas, blood clots, contour deformity and asymmetry. With liposuction, contour deformities are a risk. I discussed scarring, loss of umbilicus, possible inferior displacement of umbilicus. Patient acknowledged.     I discussed postoperative care with usage of drains and garment was measured.     I discussed increased risk of complications given her elevated BMI of 37.5, including wound dehiscence particularly at T incision and delayed wound healing. Patient acknowledged.     Photos taken, surgery scheduled.  Will need tumescence and exparel  Garment measured  Consents obtained, will send prescriptions to pharmacy  -Spent 35 minutes in consultation with patient. Greater than 50% of the total time was spent obtaining history, " evaluation, performing exam, discussion of management options including post-operative care, answering patient's questions and concerns, chart reviewing, and documentation        Leonard Bustamante MD   [facility] Plastic and Reconstructive Surgery   [address] [place] [address], [address]   [place] [address]   Office: [phone]        HPI from 10/30/23:  Patient is a 52 y/o female who is interested in abdominoplasty. Patient is a massive weight loss patient who underwent gastric sleeve in 2010. Her max weight was 395 lbs and currently is 220 lbs, and has had net weight-loss of 170 lbs. She has no history of blood clots.      Currently she is at her goal weight between 200-220 lbs. She states that with her weight loss she has had significant excess skin that hangs over her umbilicus and pubis. She has been following up with her PCP and has had documented treatments including powders and creams to alleviate her symptoms of intrigo, open wounds, moisture, foul smell. She states that the symptoms are significantly exacerbated with hot weather during the summer. She has been following up with PCP for over 3 months time.      She is primarily interested in removal of the excess skin.     ROS: 12 pt ROS negative, except as otherwise noted in HPI     Medical History           Past Medical History:   Diagnosis Date    Hyperlipidemia      Hypertension              FamHx: non-contrib. No blood clots  SurgHx: gastric sleeve, tubal ligation, tonsillectomy  SocHx: no tobacco. +social ETOH  Meds: no steroids, no blood thinners  Allergies: NKDA     PE:           Vitals:     10/30/23 1018   BP: 132/88   Pulse: (!) 54   Temp: (!) 97.2 °F (36.2 °C)         General: NC/AT, breathing comfortably on RA  Neuro: CN II-XII grossly intact, symmetric reflexes  HEENT: PERRLA, EOMI, external ears normal, no lesions or deformities, neck supple, trachea midline  Respiratory: CTAB, normal respiratory effort  Cardio: RRR, normal S1, S2, no murmur, rubs,  gallops  GI: soft, non-tender, non-distended  Extremities/MSK: normal alignment, mobility, gait, no edema  Skin: no rashes, lesions, subcutaneous nodules     BMI 39.55     Significant moderate-severe diffuse abdominal lipodystrophy   Severe excess skin in the vertical and horizontal vectors  Overhanging double pannus over the umbilicus and pubic area  Umbilical pannus with periumbilical intertrigo and redness  Overhanging abdominal pannus overhangs pubis by 5 cm, moist areas underneath  Minimal diastasis, no hernia     A/P: 52 y/o female who presents with symptomatic abdominal double pannus from massive weight loss of 170 lbs, now with stable weight over last 6-12 months.     -Discussed with patient that she is a good candidate for zcwhq-zv-njq panniculectomy. I explained that qmwrv-kh-hif panniculectomy is a functional procedure, not cosmetic. The only thing that will be removed is the overhanging excess abdominal skin in the umbilical region and overhanging pubic region. The abdominal contour and lipodystrophy is not addressed. Also, loss of umbilicus is a possibility as well as inferiorly displaced umbilicus. Patient acknowledged. She understands that the surgery will only remove the excess overhanging skin causing her symptoms. Due to the excess skin in the vertical and horizontal components as well as the overhanging double pannus over the umbilicus, patient would benefit from xqecz-zy-elg skin excision. This would create vertical midline scar, patient is okay and acknowledged this.  -At this time, patient is NOT a candidate for lipoabdominoplasty due to her elevated BMI of 39. I informed her that her BMI would need to be closer to 30 before any cosmetic surgery would be discussed. Currently, patient would like to proceed only with functional, insurance dqxea-wc-zoh panniculectomy.  -I also informed her that her elevated BMI increases her risk of complications including but not limited to wound breakdown,  infection, abscesses, delayed wound healing. Patient acknowledged.  -Risks, benefits, procedure, and complications discussed. Patient acknowledged. All questions answered, concerns addressed.  -Photos taken, will submit for insurance approval.  -If approved, will require preop labs (CBC, CMP, HgbA1C, PT/INR, iron panel, Vit B12/folate) to be completed for preop appointment  -Spent 45 minutes in consultation with patient. Greater than 50% of the total time was spent obtaining history, evaluation, performing exam, discussion of management options including post-operative care, answering patient's questions and concerns, chart reviewing, and documentation           Leonard Bustamante MD   [facility] Plastic and Reconstructive Surgery   [address], [address]   [place] [address]   Office: [phone]

## 2024-03-07 NOTE — DISCHARGE INSTR - AVS FIRST PAGE
Discharge Instructions    -Take tylenol 500 mg as scheduled for pain. For severe breakthrough pain, take oxycodone 5 mg as scheduled.  -Do not drive or operate heavy machinery when taking narcotic pain medicine as it can cause drowsiness.  -No showering for 48 hours. After 48 hours, can remove all dressings (remove yellow gauze from belly button and all other gauze and pads), and shower. Shower with garment if possible after removing all gauze, as the swelling makes it difficult to remove and put back on. No baths, hottubs, pools, or soaking incision.  -After shower, pat incisions dry. Towel dry garment. Dress incisions with gauze. Incision may ooze for first few days, this is normal. Dressing may need to be changed more frequently if this occurs.  -Strip, empty, and record HAYDE drain output at least three-times per day. Bring logbook to clinic as this determines when the drains can be removed. Again, the first 24 hours, will have higher amount of bloody drainage and should become less and lighter in color over the next 48 hours.  -No strenuous activity, no heavy lifting, (nothing over 5 lbs), no reaching above shoulder or head as this can pull on incisions.  -Resume all home medications  -Resume regular diet  -Wear abdominal girdle at all times.    IMPORTANT:  -If acute-onset unilateral calf pain and swelling or acute onset shortness of breath, feeling of about to pass out, losing consciousness, numbness or tingling in the mouth area, ringing in the ears, please go to emergency room immediately.     -Call [facility]astic [facility] to make follow-up appointment with Dr Bustamante's physician assistant in 7 days.    Leonard Bustamante MD   [facility] Plastic and Reconstructive Surgery   [address], [address]   [place] [address]   Office: [phone]

## 2024-03-07 NOTE — ANESTHESIA PREPROCEDURE EVALUATION
Procedure:  RUDDY PATEL PANNICULECTOMY (Abdomen)  LIPOSUCTION ABDOMEN (Abdomen)    Relevant Problems   CARDIO   (+) Hyperlipidemia      HEMATOLOGY   (+) Chronic anemia      MUSCULOSKELETAL   (+) Arthritis   (+) Low back pain      NEURO/PSYCH   (+) Anxiety        Physical Exam    Airway    Mallampati score: II  TM Distance: >3 FB  Neck ROM: full     Dental   No notable dental hx     Cardiovascular  Cardiovascular exam normal    Pulmonary  Pulmonary exam normal     Other Findings  post-pubertal.      Anesthesia Plan  ASA Score- 2     Anesthesia Type- general with ASA Monitors.         Additional Monitors:     Airway Plan: ETT.           Plan Factors-Exercise tolerance (METS): >4 METS.    Chart reviewed.   Existing labs reviewed.     Patient is not a current smoker. Patient not instructed to abstain from smoking on day of procedure. Patient did not smoke on day of surgery.            Induction- intravenous.    Postoperative Plan-     Informed Consent- Anesthetic plan and risks discussed with patient.  I personally reviewed this patient with the CRNA. Discussed and agreed on the Anesthesia Plan with the CRNA..

## 2024-03-07 NOTE — ANESTHESIA POSTPROCEDURE EVALUATION
Post-Op Assessment Note    CV Status:  Stable  Pain Score: 0    Pain management: adequate       Mental Status:  Alert and awake   Hydration Status:  Stable   PONV Controlled:  None   Airway Patency:  Patent    No anethesia notable event occurred.    Staff: Anesthesiologist, with CRNAs               BP      Temp      Pulse     Resp      SpO2

## 2024-03-08 NOTE — NURSING NOTE
Pt is awake,alert,tolerated diet, voided using female urinal. Written and verbal instructions given to pt and her , who verbalize an understanding. Drain care instructions given and how to document on the drain flow sheet,  verbalizes an understanding.

## 2024-03-14 ENCOUNTER — OFFICE VISIT (OUTPATIENT)
Dept: PLASTIC SURGERY | Facility: CLINIC | Age: 54
End: 2024-03-14

## 2024-03-14 DIAGNOSIS — Z41.1 ENCOUNTER FOR COSMETIC PROCEDURE: ICD-10-CM

## 2024-03-14 DIAGNOSIS — E65 ABDOMINAL PANNUS: Primary | ICD-10-CM

## 2024-03-14 PROCEDURE — 99024 POSTOP FOLLOW-UP VISIT: CPT | Performed by: PHYSICIAN ASSISTANT

## 2024-03-14 NOTE — PROGRESS NOTES
POST-OP EVALUATION  3/14/2024    Subjective   Linda Rendon is a 54 y.o. female is here today for routine post-operative follow up.  03/07/24 1325         Procedures:      RUDDY DE LIS PANNICULECTOMY (Abdomen)      LIPOSUCTION ABDOMEN (Abdomen)     Past Medical History:   Diagnosis Date    Anxiety     Arthritis     Depression     Hyperlipidemia     Hypertension      Past Surgical History:   Procedure Laterality Date    GASTRIC RESTRICTION SURGERY  2010    SLEEVE    NV EXCISION SKIN ABD INFRAUMBILICAL PANNICULECTOMY N/A 3/7/2024    Procedure: RUDDY DE LIS PANNICULECTOMY;  Surgeon: Leonard Bustamante MD;  Location:  MAIN OR;  Service: Plastics    NV SUCTION ASSISTED LIPECTOMY TRUNK N/A 3/7/2024    Procedure: LIPOSUCTION ABDOMEN;  Surgeon: Leonard Bustamante MD;  Location:  MAIN OR;  Service: Plastics    TONSILLECTOMY      TUBAL LIGATION  1994        Current Outpatient Medications:     acetaminophen (TYLENOL) 500 mg tablet, Take 1 tablet (500 mg total) by mouth every 4 (four) hours as needed for mild pain or moderate pain, Disp: 30 tablet, Rfl: 2    aspirin (ECOTRIN LOW STRENGTH) 81 mg EC tablet, Take by mouth, Disp: , Rfl:     celecoxib (CeleBREX) 200 mg capsule, Take 200 mg by mouth daily as needed, Disp: , Rfl:     diazepam (VALIUM) 5 mg tablet, Take 5 mg by mouth 3 (three) times a day, Disp: , Rfl:     docusate sodium (COLACE) 100 mg capsule, Take 1 capsule (100 mg total) by mouth 2 (two) times a day as needed for constipation, Disp: 20 capsule, Rfl: 2    DULoxetine (CYMBALTA) 60 mg delayed release capsule, Take 60 mg by mouth daily, Disp: , Rfl:     ergocalciferol (VITAMIN D2) 50,000 units, 50,000 Units once a week, Disp: , Rfl:     eszopiclone (LUNESTA) 3 MG tablet, Take 3 mg by mouth daily at bedtime, Disp: , Rfl:     fenofibrate micronized (LOFIBRA) 134 MG capsule, Take 134 mg by mouth daily at bedtime, Disp: , Rfl:     fluticasone (FLONASE) 50 mcg/act nasal spray, fluticasone propionate 50 mcg/actuation nasal  spray,suspension  instill 1 spray into each nostril once daily, Disp: , Rfl:     furosemide (LASIX) 20 mg tablet, Take 20 mg by mouth in the evening. Take before meals, Disp: , Rfl:     furosemide (LASIX) 40 mg tablet, Take 40 mg by mouth daily, Disp: , Rfl:     lactulose (CHRONULAC) 10 g/15 mL solution, Take 10 g by mouth daily, Disp: , Rfl:     Multiple Vitamin (Multi-Vitamin Daily) TABS, Take 1 tablet by mouth daily, Disp: , Rfl:     naloxone (NARCAN) 4 mg/0.1 mL nasal spray, Administer 1 spray into a nostril. If no response after 2-3 minutes, give another dose in the other nostril using a new spray., Disp: 1 each, Rfl: 1    nystatin (MYCOSTATIN) cream, , Disp: , Rfl:     ondansetron (ZOFRAN) 4 mg tablet, TAKE 1 TABLET BY MOUTH EVERY 8 HOURS AS NEEDED FOR NAUSEA AND VOMITING, Disp: 20 tablet, Rfl: 0    oxyCODONE (Roxicodone) 5 immediate release tablet, Take 1 tablet (5 mg total) by mouth every 4 (four) hours as needed for severe pain Max Daily Amount: 30 mg, Disp: 30 tablet, Rfl: 0    rosuvastatin (CRESTOR) 5 mg tablet, Take 5 mg by mouth daily at bedtime, Disp: , Rfl:   Allergies: Propoxyphene and Pollen extract    Objective      Incisions all clean, dry, intact. Exofin Fusion in place.  Some ecchymosis.  Abdomen is soft.  (See photo)    Assessment/Plan   Diagnoses and all orders for this visit:    Abdominal pannus    Encounter for cosmetic procedure    Doing very well.  Drains kept in place due to output.  Continue with compression garment.  Strip drains, record output.  Followup in one week.    Brandon Oconnor PA-C

## 2024-03-22 ENCOUNTER — OFFICE VISIT (OUTPATIENT)
Dept: PLASTIC SURGERY | Facility: CLINIC | Age: 54
End: 2024-03-22

## 2024-03-22 DIAGNOSIS — Z98.890 S/P ABDOMINOPLASTY: ICD-10-CM

## 2024-03-22 DIAGNOSIS — Z41.1 ENCOUNTER FOR COSMETIC PROCEDURE: ICD-10-CM

## 2024-03-22 DIAGNOSIS — E65 ABDOMINAL PANNUS: Primary | ICD-10-CM

## 2024-03-22 PROCEDURE — RECHECK

## 2024-03-22 NOTE — PROGRESS NOTES
[facility] Plastic and Reconstructive Surgery  [address], [address], [place] [address]  [phone]    Patient Identification: Linda Rendon is a 54 y.o. female     History of Present Illness: The patient is a 54 y.o.  year-old female  who presents to the office for post-op visit. Patient is 15 days s/p Rachel De Lis Panniculectomy and Liposuction Abdomen  on 3/7/2024 by Dr. Bustamante.  Patient is doing well at this time. She is wearing compression garment at all times. Following activity restrictions. Recording drain outputs daily, right drain 10cc, left drain 30cc. Denies fevers, chills, signs of infection or significant pain.  Patient has no complaints at this time.    Past Medical History:   Diagnosis Date    Anxiety     Arthritis     Depression     Hyperlipidemia     Hypertension           Review of Systems  Constitutional: Denies fevers, chills or pain.  Skin: Denies any warmth, erythema, edema or mucopurulent drainage.     Physical Exam    Abdomen: Prineo removed. Surgical incisions are clean, dry, and intact. Skin perfusion is intact. Belly button is viable, sutures removed. Expected amount of post-operative edema. There are no signs of infection, obvious hematoma, seroma or wound dehiscence. Drains are patent bilaterally with sanguineous fluid in bulbs.     Assessment and Plan:  The patient is an 54 y.o.  year-old female who presents to the office for post-op visit. Patient is 15 days s/p Rachel De Lis Panniculectomy and Liposuction Abdomen  on 3/7/2024 by Dr. Bustamante    -At today's visit Prineo removed. Belly button sutures removed.  -Patient's right abdominal drain was removed at today's visit (10cc daily output). Drain site was dressed with bacitracin and a Band-aid. Area may have drainage for 1-2 days, may change Band-aid as needed.  -Continue following activity restrictions, sleep on back with pillow under the knees.  -Apply bacitracin/Aquaphor to incision sites daily.  -The patient is to return  in 1 week for drain removal.  -The patient is to call the office with any questions or concerns. All of the patient's questions were answered at this time and they agree with the plan of care.      Kayley Sutherland PA-C  [facility] Plastic and Reconstructive Surgery

## 2024-03-28 ENCOUNTER — OFFICE VISIT (OUTPATIENT)
Dept: PLASTIC SURGERY | Facility: CLINIC | Age: 54
End: 2024-03-28

## 2024-03-28 DIAGNOSIS — Z98.890 S/P PANNICULECTOMY: ICD-10-CM

## 2024-03-28 DIAGNOSIS — Z41.1 ENCOUNTER FOR COSMETIC PROCEDURE: Primary | ICD-10-CM

## 2024-03-28 PROCEDURE — 99024 POSTOP FOLLOW-UP VISIT: CPT | Performed by: PHYSICIAN ASSISTANT

## 2024-03-28 NOTE — PROGRESS NOTES
Assessment/Plan:     Patient is a 54-year-old female who is status post atbwi-js-wtf panniculectomy with abdominal liposuction on 3/7/24.  Please see HPI.    Patient returns to the office today for routine postoperative check and drain check.  Drain continues to have approximately 35-40cc of output daily.  She will return to the office early next week for drain removal.      Diagnoses and all orders for this visit:    Encounter for cosmetic procedure    S/P panniculectomy          Subjective:     Patient ID: Linda Rendon is a 54 y.o. female.    HPI    Patient reports that she has been doing well.  No issues or concerns.    Review of Systems    See HPI     Objective:     Physical Exam      Incisions are clean, dry, intact and with minimal scarring.  Patient is healing appropriately.

## 2024-04-02 ENCOUNTER — OFFICE VISIT (OUTPATIENT)
Dept: PLASTIC SURGERY | Facility: CLINIC | Age: 54
End: 2024-04-02

## 2024-04-02 DIAGNOSIS — Z98.890 S/P PANNICULECTOMY: Primary | ICD-10-CM

## 2024-04-02 PROCEDURE — 99024 POSTOP FOLLOW-UP VISIT: CPT | Performed by: PHYSICIAN ASSISTANT

## 2024-04-02 NOTE — PROGRESS NOTES
POST-OP EVALUATION  4/2/2024    Subjective   Linda Rendon is a 54 y.o. female is here today for routine post-operative follow up.  03/07/24 1325               Procedures:      RUDDY DE LIS PANNICULECTOMY (Abdomen)      LIPOSUCTION ABDOMEN (Abdomen)   She returns today for wound evaluation and drain removal.    She feels well. Without concern, and happy with the results.    Past Medical History:   Diagnosis Date    Anxiety     Arthritis     Depression     Hyperlipidemia     Hypertension      Past Surgical History:   Procedure Laterality Date    GASTRIC RESTRICTION SURGERY  2010    SLEEVE    TX EXCISION SKIN ABD INFRAUMBILICAL PANNICULECTOMY N/A 3/7/2024    Procedure: RUDDY DE LIS PANNICULECTOMY;  Surgeon: Leonard Bustamante MD;  Location:  MAIN OR;  Service: Plastics    TX SUCTION ASSISTED LIPECTOMY TRUNK N/A 3/7/2024    Procedure: LIPOSUCTION ABDOMEN;  Surgeon: Leonard Bustamante MD;  Location:  MAIN OR;  Service: Plastics    TONSILLECTOMY      TUBAL LIGATION  1994        Current Outpatient Medications:     acetaminophen (TYLENOL) 500 mg tablet, Take 1 tablet (500 mg total) by mouth every 4 (four) hours as needed for mild pain or moderate pain, Disp: 30 tablet, Rfl: 2    aspirin (ECOTRIN LOW STRENGTH) 81 mg EC tablet, Take by mouth, Disp: , Rfl:     celecoxib (CeleBREX) 200 mg capsule, Take 200 mg by mouth daily as needed, Disp: , Rfl:     diazepam (VALIUM) 5 mg tablet, Take 5 mg by mouth 3 (three) times a day, Disp: , Rfl:     docusate sodium (COLACE) 100 mg capsule, Take 1 capsule (100 mg total) by mouth 2 (two) times a day as needed for constipation, Disp: 20 capsule, Rfl: 2    DULoxetine (CYMBALTA) 60 mg delayed release capsule, Take 60 mg by mouth daily, Disp: , Rfl:     ergocalciferol (VITAMIN D2) 50,000 units, 50,000 Units once a week, Disp: , Rfl:     eszopiclone (LUNESTA) 3 MG tablet, Take 3 mg by mouth daily at bedtime, Disp: , Rfl:     fenofibrate micronized (LOFIBRA) 134 MG capsule, Take 134 mg by mouth  daily at bedtime, Disp: , Rfl:     fluticasone (FLONASE) 50 mcg/act nasal spray, fluticasone propionate 50 mcg/actuation nasal spray,suspension  instill 1 spray into each nostril once daily, Disp: , Rfl:     furosemide (LASIX) 20 mg tablet, Take 20 mg by mouth in the evening. Take before meals, Disp: , Rfl:     furosemide (LASIX) 40 mg tablet, Take 40 mg by mouth daily, Disp: , Rfl:     lactulose (CHRONULAC) 10 g/15 mL solution, Take 10 g by mouth daily, Disp: , Rfl:     Multiple Vitamin (Multi-Vitamin Daily) TABS, Take 1 tablet by mouth daily, Disp: , Rfl:     naloxone (NARCAN) 4 mg/0.1 mL nasal spray, Administer 1 spray into a nostril. If no response after 2-3 minutes, give another dose in the other nostril using a new spray., Disp: 1 each, Rfl: 1    nystatin (MYCOSTATIN) cream, , Disp: , Rfl:     ondansetron (ZOFRAN) 4 mg tablet, TAKE 1 TABLET BY MOUTH EVERY 8 HOURS AS NEEDED FOR NAUSEA AND VOMITING, Disp: 20 tablet, Rfl: 0    oxyCODONE (Roxicodone) 5 immediate release tablet, Take 1 tablet (5 mg total) by mouth every 4 (four) hours as needed for severe pain Max Daily Amount: 30 mg, Disp: 30 tablet, Rfl: 0    rosuvastatin (CRESTOR) 5 mg tablet, Take 5 mg by mouth daily at bedtime, Disp: , Rfl:   Allergies: Propoxyphene and Pollen extract    Objective      Incisions clean, dry, intact.  Abdomen soft, nontender.    Drain removed.    Assessment/Plan   Diagnoses and all orders for this visit:    S/P panniculectomy    Aquaphor to incision.  Continue with compression garment.  Followup in 8 weeks. Call with any concerns.    Brandon Oconnor PA-C

## 2024-05-03 DIAGNOSIS — Z00.6 ENCOUNTER FOR EXAMINATION FOR NORMAL COMPARISON OR CONTROL IN CLINICAL RESEARCH PROGRAM: ICD-10-CM

## 2024-05-23 ENCOUNTER — OFFICE VISIT (OUTPATIENT)
Dept: PLASTIC SURGERY | Facility: CLINIC | Age: 54
End: 2024-05-23

## 2024-05-23 DIAGNOSIS — Z98.890 S/P PANNICULECTOMY: Primary | ICD-10-CM

## 2024-05-23 PROCEDURE — 99024 POSTOP FOLLOW-UP VISIT: CPT | Performed by: PHYSICIAN ASSISTANT

## 2024-05-23 NOTE — PROGRESS NOTES
POST-OP EVALUATION  5/23/2024    Subjective   Linda Rendon is a 54 y.o. female is here today for routine post-operative follow up.  03/07/24 1325               Procedures:      RUDDY DE LIS PANNICULECTOMY (Abdomen)      LIPOSUCTION ABDOMEN (Abdomen)   She returns today for wound evaluation    Linda is very happy with her results.  She can fit into an old pair of jeans.  Past Medical History:   Diagnosis Date    Anxiety     Arthritis     Depression     Hyperlipidemia     Hypertension      Past Surgical History:   Procedure Laterality Date    GASTRIC RESTRICTION SURGERY  2010    S[facility]E    NE EXCISION SKIN ABD INFRAUMBILICAL PANNICULECTOMY N/A 3/7/2024    Procedure: RUDDY DE LIS PANNICULECTOMY;  Surgeon: Leonard Bustamante MD;  Location:  MAIN OR;  Service: Plastics    NE SUCTION ASSISTED LIPECTOMY TRUNK N/A 3/7/2024    Procedure: LIPOSUCTION ABDOMEN;  Surgeon: Leonard Bustamante MD;  Location:  MAIN OR;  Service: Plastics    TONSILLECTOMY      TUBAL LIGATION  1994        Current Outpatient Medications:     acetaminophen (TYLENOL) 500 mg tablet, Take 1 tablet (500 mg total) by mouth every 4 (four) hours as needed for mild pain or moderate pain, Disp: 30 tablet, Rfl: 2    aspirin (ECOTRIN LOW STRENGTH) 81 mg EC tablet, Take by mouth, Disp: , Rfl:     celecoxib (CeleBREX) 200 mg capsule, Take 200 mg by mouth daily as needed, Disp: , Rfl:     diazepam (VALIUM) 5 mg tablet, Take 5 mg by mouth 3 (three) times a day, Disp: , Rfl:     docusate sodium (COLACE) 100 mg capsule, Take 1 capsule (100 mg total) by mouth 2 (two) times a day as needed for constipation, Disp: 20 capsule, Rfl: 2    DULoxetine (CYMBALTA) 60 mg delayed release capsule, Take 60 mg by mouth daily, Disp: , Rfl:     ergocalciferol (VITAMIN D2) 50,000 units, 50,000 Units once a week, Disp: , Rfl:     eszopiclone (LUNESTA) 3 MG tablet, Take 3 mg by mouth daily at bedtime, Disp: , Rfl:     fenofibrate micronized (LOFIBRA) 134 MG capsule, Take 134 mg by mouth  daily at bedtime, Disp: , Rfl:     fluticasone (FLONASE) 50 mcg/act nasal spray, fluticasone propionate 50 mcg/actuation nasal spray,suspension  instill 1 spray into each nostril once daily, Disp: , Rfl:     furosemide (LASIX) 20 mg tablet, Take 20 mg by mouth in the evening. Take before meals, Disp: , Rfl:     furosemide (LASIX) 40 mg tablet, Take 40 mg by mouth daily, Disp: , Rfl:     lactulose (CHRONULAC) 10 g/15 mL solution, Take 10 g by mouth daily, Disp: , Rfl:     Multiple Vitamin (Multi-Vitamin Daily) TABS, Take 1 tablet by mouth daily, Disp: , Rfl:     naloxone (NARCAN) 4 mg/0.1 mL nasal spray, Administer 1 spray into a nostril. If no response after 2-3 minutes, give another dose in the other nostril using a new spray., Disp: 1 each, Rfl: 1    nystatin (MYCOSTATIN) cream, , Disp: , Rfl:     ondansetron (ZOFRAN) 4 mg tablet, TAKE 1 TABLET BY MOUTH EVERY 8 HOURS AS NEEDED FOR NAUSEA AND VOMITING, Disp: 20 tablet, Rfl: 0    oxyCODONE (Roxicodone) 5 immediate release tablet, Take 1 tablet (5 mg total) by mouth every 4 (four) hours as needed for severe pain Max Daily Amount: 30 mg, Disp: 30 tablet, Rfl: 0    rosuvastatin (CRESTOR) 5 mg tablet, Take 5 mg by mouth daily at bedtime, Disp: , Rfl:   Allergies: Propoxyphene and Pollen extract    Objective      Incisions clean, dry, intact. Abdomen is soft, nontender.  Mild parasthesias.    Assessment & Plan   Diagnoses and all orders for this visit:    S/P panniculectomy    Post Op Scar Care Instructions:  -Massage silicone scar gel (ex, Biocorneum, [employer] Silicone Scar Gel, Scar Away) into incision twice daily for 3 months.   Or   -Apply silicone scar patch (ex, Oleeva) to incision for up to 23 hours per day for 3 months.   -Wear SPF 30+ on incision at all times with any sun exposure.     Call with any questions or concerns.   Followup prn    Brandon Oconnor PA-C

## 2024-10-19 ENCOUNTER — OFFICE VISIT (OUTPATIENT)
Dept: URGENT CARE | Facility: CLINIC | Age: 54
End: 2024-10-19
Payer: COMMERCIAL

## 2024-10-19 VITALS
DIASTOLIC BLOOD PRESSURE: 79 MMHG | OXYGEN SATURATION: 99 % | TEMPERATURE: 97 F | HEART RATE: 61 BPM | RESPIRATION RATE: 18 BRPM | SYSTOLIC BLOOD PRESSURE: 120 MMHG

## 2024-10-19 DIAGNOSIS — B35.4 TINEA CORPORIS: Primary | ICD-10-CM

## 2024-10-19 PROCEDURE — G0383 LEV 4 HOSP TYPE B ED VISIT: HCPCS | Performed by: PHYSICIAN ASSISTANT

## 2024-10-19 RX ORDER — KETOCONAZOLE 20 MG/G
CREAM TOPICAL DAILY
Qty: 30 G | Refills: 0 | Status: SHIPPED | OUTPATIENT
Start: 2024-10-19 | End: 2024-11-02

## 2024-10-19 NOTE — PROGRESS NOTES
[facility]        NAME: Linda Rendon is a 54 y.o. female  : 1970    MRN: [id number]  DATE: 2024  TIME: 3:46 PM    Assessment and Plan   Tinea corporis [B35.4]  1. Tinea corporis  ketoconazole (NIZORAL) 2 % cream            Patient Instructions   Use ketoconazole as directed.  If not improving follow-up with PCP.  We did discuss this is contagious.  If tests have been performed at [facility], our office will contact you with results if changes need to be made to the care plan discussed with you at the visit.  You can review your full results on [facility]  Follow up with PCP in 3-5 days.  Proceed to  ER if symptoms worsen.    Chief Complaint     Chief Complaint   Patient presents with    Rash     Patient here with rash under left arm. She states it started as a small area right after she stayed in a hotel almost a month ago. She thought it was ringworm and was putting cream on it, but the rash is getting bigger.          History of Present Illness       Rash  Pertinent negatives include no fever or shortness of breath.     This is a 54-year-old female who is here complaining of a rash on the posterior aspect of her left upper arm.  She notes rash been there approximately 2.5 weeks.  She notes she stayed in a hotel in [place] and it started as 2 little dots and has grown into this area.  Patient has been using Lotrimin without relief.  She notes today the area is super itchy.  She notes no one else in the house has a rash.  She denies fever, vomiting, diarrhea, shortness of breath or chest pain.  Review of Systems   Review of Systems   Constitutional:  Negative for fever.   Respiratory:  Negative for shortness of breath.    Cardiovascular:  Negative for chest pain.   Skin:  Positive for rash.         Current Medications       Current Outpatient Medications:     acetaminophen (TYLENOL) 500 mg tablet, Take 1 tablet (500 mg total) by mouth every 4 (four) hours as needed for mild pain  or moderate pain, Disp: 30 tablet, Rfl: 2    aspirin (ECOTRIN LOW STRENGTH) 81 mg EC tablet, Take by mouth, Disp: , Rfl:     celecoxib (CeleBREX) 200 mg capsule, Take 200 mg by mouth daily as needed, Disp: , Rfl:     diazepam (VALIUM) 5 mg tablet, Take 5 mg by mouth 3 (three) times a day, Disp: , Rfl:     docusate sodium (COLACE) 100 mg capsule, Take 1 capsule (100 mg total) by mouth 2 (two) times a day as needed for constipation, Disp: 20 capsule, Rfl: 2    DULoxetine (CYMBALTA) 60 mg delayed release capsule, Take 60 mg by mouth daily, Disp: , Rfl:     ergocalciferol (VITAMIN D2) 50,000 units, 50,000 Units once a week, Disp: , Rfl:     eszopiclone (LUNESTA) 3 MG tablet, Take 3 mg by mouth daily at bedtime, Disp: , Rfl:     fenofibrate micronized (LOFIBRA) 134 MG capsule, Take 134 mg by mouth daily at bedtime, Disp: , Rfl:     fluticasone (FLONASE) 50 mcg/act nasal spray, fluticasone propionate 50 mcg/actuation nasal spray,suspension  instill 1 spray into each nostril once daily, Disp: , Rfl:     furosemide (LASIX) 20 mg tablet, Take 20 mg by mouth in the evening. Take before meals, Disp: , Rfl:     furosemide (LASIX) 40 mg tablet, Take 40 mg by mouth daily, Disp: , Rfl:     ketoconazole (NIZORAL) 2 % cream, Apply topically daily for 14 days, Disp: 30 g, Rfl: 0    lactulose (CHRONULAC) 10 g/15 mL solution, Take 10 g by mouth daily, Disp: , Rfl:     Multiple Vitamin (Multi-Vitamin Daily) TABS, Take 1 tablet by mouth daily, Disp: , Rfl:     ondansetron (ZOFRAN) 4 mg tablet, TAKE 1 TABLET BY MOUTH EVERY 8 HOURS AS NEEDED FOR NAUSEA AND VOMITING, Disp: 20 tablet, Rfl: 0    rosuvastatin (CRESTOR) 5 mg tablet, Take 5 mg by mouth daily at bedtime, Disp: , Rfl:     naloxone (NARCAN) 4 mg/0.1 mL nasal spray, Administer 1 spray into a nostril. If no response after 2-3 minutes, give another dose in the other nostril using a new spray. (Patient not taking: Reported on 10/19/2024), Disp: 1 each, Rfl: 1    nystatin (MYCOSTATIN)  cream, , Disp: , Rfl:     oxyCODONE (Roxicodone) 5 immediate release tablet, Take 1 tablet (5 mg total) by mouth every 4 (four) hours as needed for severe pain Max Daily Amount: 30 mg (Patient not taking: Reported on 10/19/2024), Disp: 30 tablet, Rfl: 0    Current Allergies     Allergies as of 10/19/2024 - Reviewed 10/19/2024   Allergen Reaction Noted    Propoxyphene Other (See Comments) 06/07/2022    Pollen extract Eye Swelling 06/20/2023            The following portions of the patient's history were reviewed and updated as appropriate: allergies, current medications, past family history, past medical history, past social history, past surgical history and problem list.     Past Medical History:   Diagnosis Date    Anxiety     Arthritis     Depression     Hyperlipidemia     Hypertension        Past Surgical History:   Procedure Laterality Date    GASTRIC RESTRICTION SURGERY  2010    [facility]    FL EXCISION SKIN ABD INFRAUMBILICAL PANNICULECTOMY N/A 3/7/2024    Procedure: RUDDY PATEL PANNICULECTOMY;  Surgeon: Leonard Bustamante MD;  Location:  MAIN OR;  Service: Plastics    FL SUCTION ASSISTED LIPECTOMY TRUNK N/A 3/7/2024    Procedure: LIPOSUCTION ABDOMEN;  Surgeon: Leonard Bustamante MD;  Location:  MAIN OR;  Service: Plastics    TONSILLECTOMY      TUBAL LIGATION  1994       Family History   Problem Relation Age of Onset    Cancer Father     Diabetes Father          Medications have been verified.        Objective   /79   Pulse 61   Temp (!) 97 °F (36.1 °C)   Resp 18   SpO2 99%        Physical Exam     Physical Exam  Vitals and nursing note reviewed.   Constitutional:       General: She is not in acute distress.     Appearance: Normal appearance. She is not ill-appearing or toxic-appearing.   Cardiovascular:      Rate and Rhythm: Normal rate and regular rhythm.   Pulmonary:      Effort: Pulmonary effort is normal.      Breath sounds: Normal breath sounds.   Skin:     Comments: 2 circular areas on the  posterior aspect of the left upper arm that have raised borders and clear center.  See photo.   Neurological:      Mental Status: She is alert.

## (undated) DEVICE — SUT STRATAFIX SPIRAL PLUS 3-0 PS-2 30 X 30 CM SXMP2B408

## (undated) DEVICE — PROXIMATE SKIN STAPLERS (35 WIDE) CONTAINS 35 STAINLESS STEEL STAPLES (FIXED HEAD): Brand: PROXIMATE

## (undated) DEVICE — PREMIUM DRY TRAY LF: Brand: MEDLINE INDUSTRIES, INC.

## (undated) DEVICE — SYRINGE 50ML LL

## (undated) DEVICE — STERILE POLYISOPRENE POWDER-FREE SURGICAL GLOVES WITH EMOLLIENT COATING: Brand: PROTEXIS

## (undated) DEVICE — CRADLE EXTREMITY UNIVERSAL CONTOURED

## (undated) DEVICE — POV-IOD SOLUTION 4OZ BT

## (undated) DEVICE — SCD SEQUENTIAL COMPRESSION COMFORT SLEEVE MEDIUM KNEE LENGTH: Brand: KENDALL SCD

## (undated) DEVICE — INTENDED FOR TISSUE SEPARATION, AND OTHER PROCEDURES THAT REQUIRE A SHARP SURGICAL BLADE TO PUNCTURE OR CUT.: Brand: BARD-PARKER ® SAFETYLOCK CARBON RIB-BACK BLADES

## (undated) DEVICE — 3M™ STERI-STRIP™ REINFORCED ADHESIVE SKIN CLOSURES, R1547, 1/2 IN X 4 IN (12 MM X 100 MM), 6 STRIPS/ENVELOPE: Brand: 3M™ STERI-STRIP™

## (undated) DEVICE — TRAY FOLEY 16FR URIMETER SURESTEP

## (undated) DEVICE — ELECTRODE BLADE MOD E-Z CLEAN 2.5IN 6.4CM -0012M

## (undated) DEVICE — 1820 FOAM BLOCK NEEDLE COUNTER: Brand: DEVON

## (undated) DEVICE — OCCLUSIVE GAUZE STRIP,3% BISMUTH TRIBROMOPHENATE IN PETROLATUM BLEND: Brand: XEROFORM

## (undated) DEVICE — NEEDLE BLUNT 18 G X 1 1/2IN

## (undated) DEVICE — DRAPE SHEET THREE QUARTER

## (undated) DEVICE — PACK UNIVERSAL DRAPES SUB-Q ICD

## (undated) DEVICE — SUT ETHILON 3-0 PS-1 18 IN 1663G

## (undated) DEVICE — TUBING INFILTRATION 9FT

## (undated) DEVICE — SUT ETHILON 4-0 PS-2 18 IN 1667H

## (undated) DEVICE — SUT PROLENE 1 CT-1 30 IN 8425H

## (undated) DEVICE — TUBING ASPIRATION LIPOSUCTION SET 12FT

## (undated) DEVICE — SYRINGE 30ML LL

## (undated) DEVICE — DISPOSABLE OR TOWEL: Brand: CARDINAL HEALTH

## (undated) DEVICE — STERILE POLYISOPRENE POWDER-FREE SURGICAL GLOVES: Brand: PROTEXIS

## (undated) DEVICE — LIGHT GLOVE GREEN

## (undated) DEVICE — NEEDLE 25G X 1 1/2

## (undated) DEVICE — STANDARD SURGICAL GOWN, L: Brand: CONVERTORS

## (undated) DEVICE — CANNULA 4MM DBL MERCEDES SINGLE USE 30CM 10MM PORT

## (undated) DEVICE — CANNISTER WASTE IMPLOSION PROOF

## (undated) DEVICE — BASIC SINGLE BASIN-LF: Brand: MEDLINE INDUSTRIES, INC.

## (undated) DEVICE — JACKSON-PRATT 100CC BULB RESERVOIR: Brand: CARDINAL HEALTH

## (undated) DEVICE — NEEDLE SPINAL18G X 3.5 IN QUINCKE

## (undated) DEVICE — SUT PDS II 2-0 CT-1 27 IN Z339H

## (undated) DEVICE — SUTURE STRATAFIX 1 45CM

## (undated) DEVICE — TUBING SUCTION 5MM X 12 FT

## (undated) DEVICE — GLOVE SRG LF STRL BGL SKNSNS 6.5 PF

## (undated) DEVICE — STAPLER INSORB SUBCUTICULAR 30 SINGLE USE

## (undated) DEVICE — SKIN MARKER DUAL TIP WITH RULER CAP, FLEXIBLE RULER AND LABELS: Brand: DEVON

## (undated) DEVICE — SUT VICRYL 0 CT-1 27 IN J340H

## (undated) DEVICE — SUT VICRYL 0 CT-1 CR/8 27 IN JJ41G

## (undated) DEVICE — SINGLE PORT MANIFOLD: Brand: NEPTUNE 2

## (undated) DEVICE — DRESSING BIOPATCH ANTIMICROBIAL 1 IN DISC

## (undated) DEVICE — CHLORAPREP HI-LITE 26ML ORANGE

## (undated) DEVICE — ADHESIVE SKIN CLOSURE SYS EXOFIN FUSION 22CM

## (undated) DEVICE — BULB SYRINGE,IRRIGATION WITH PROTECTIVE CAP: Brand: DOVER

## (undated) DEVICE — SPONGE LAP 18 X 18 IN STRL RFD

## (undated) DEVICE — 3M™ TEGADERM™ TRANSPARENT FILM DRESSING FRAME STYLE, 1624W, 2-3/8 IN X 2-3/4 IN (6 CM X 7 CM), 100/CT 4CT/CASE: Brand: 3M™ TEGADERM™

## (undated) DEVICE — SYRINGE 10ML LL CONTROL TOP

## (undated) DEVICE — NEPTUNE E-SEP SMOKE EVACUATION PENCIL, COATED, 70MM BLADE, PUSH BUTTON SWITCH: Brand: NEPTUNE E-SEP

## (undated) DEVICE — SUT VICRYL 3-0 SH 27 IN J416H

## (undated) DEVICE — SUT MONOCRYL 4-0 PS-2 27 IN Y426H

## (undated) DEVICE — SUT MONOCRYL 3-0 PS-2 27 IN Y427H